# Patient Record
Sex: MALE | Race: WHITE | NOT HISPANIC OR LATINO | Employment: FULL TIME | URBAN - METROPOLITAN AREA
[De-identification: names, ages, dates, MRNs, and addresses within clinical notes are randomized per-mention and may not be internally consistent; named-entity substitution may affect disease eponyms.]

---

## 2023-08-23 ENCOUNTER — HOSPITAL ENCOUNTER (EMERGENCY)
Facility: CLINIC | Age: 28
Discharge: HOME | End: 2023-08-23
Attending: STUDENT IN AN ORGANIZED HEALTH CARE EDUCATION/TRAINING PROGRAM | Admitting: STUDENT IN AN ORGANIZED HEALTH CARE EDUCATION/TRAINING PROGRAM
Payer: COMMERCIAL

## 2023-08-23 VITALS
TEMPERATURE: 99 F | SYSTOLIC BLOOD PRESSURE: 112 MMHG | HEART RATE: 71 BPM | RESPIRATION RATE: 16 BRPM | DIASTOLIC BLOOD PRESSURE: 79 MMHG | OXYGEN SATURATION: 95 %

## 2023-08-23 DIAGNOSIS — M54.50 ACUTE BILATERAL LOW BACK PAIN WITHOUT SCIATICA: ICD-10-CM

## 2023-08-23 LAB
ALBUMIN UR-MCNC: NEGATIVE MG/DL
ANION GAP SERPL CALCULATED.3IONS-SCNC: 6 MMOL/L (ref 7–15)
APPEARANCE UR: CLEAR
BASOPHILS # BLD AUTO: 0.1 10E3/UL (ref 0–0.2)
BASOPHILS NFR BLD AUTO: 1 %
BILIRUB UR QL STRIP: NEGATIVE
BUN SERPL-MCNC: 7.4 MG/DL (ref 6–20)
CALCIUM SERPL-MCNC: 9.6 MG/DL (ref 8.6–10)
CHLORIDE SERPL-SCNC: 106 MMOL/L (ref 98–107)
COLOR UR AUTO: NORMAL
CREAT SERPL-MCNC: 0.8 MG/DL (ref 0.67–1.17)
DEPRECATED HCO3 PLAS-SCNC: 29 MMOL/L (ref 22–29)
EOSINOPHIL # BLD AUTO: 0.3 10E3/UL (ref 0–0.7)
EOSINOPHIL NFR BLD AUTO: 4 %
ERYTHROCYTE [DISTWIDTH] IN BLOOD BY AUTOMATED COUNT: 11.9 % (ref 10–15)
GFR SERPL CREATININE-BSD FRML MDRD: >90 ML/MIN/1.73M2
GLUCOSE SERPL-MCNC: 94 MG/DL (ref 70–99)
GLUCOSE UR STRIP-MCNC: NEGATIVE MG/DL
HCT VFR BLD AUTO: 45.5 % (ref 40–53)
HGB BLD-MCNC: 15 G/DL (ref 13.3–17.7)
HGB UR QL STRIP: NEGATIVE
HOLD SPECIMEN: NORMAL
IMM GRANULOCYTES # BLD: 0 10E3/UL
IMM GRANULOCYTES NFR BLD: 0 %
KETONES UR STRIP-MCNC: NEGATIVE MG/DL
LEUKOCYTE ESTERASE UR QL STRIP: NEGATIVE
LYMPHOCYTES # BLD AUTO: 3.3 10E3/UL (ref 0.8–5.3)
LYMPHOCYTES NFR BLD AUTO: 39 %
MCH RBC QN AUTO: 30.4 PG (ref 26.5–33)
MCHC RBC AUTO-ENTMCNC: 33 G/DL (ref 31.5–36.5)
MCV RBC AUTO: 92 FL (ref 78–100)
MONOCYTES # BLD AUTO: 0.5 10E3/UL (ref 0–1.3)
MONOCYTES NFR BLD AUTO: 6 %
NEUTROPHILS # BLD AUTO: 4.1 10E3/UL (ref 1.6–8.3)
NEUTROPHILS NFR BLD AUTO: 50 %
NITRATE UR QL: NEGATIVE
NRBC # BLD AUTO: 0 10E3/UL
NRBC BLD AUTO-RTO: 0 /100
PH UR STRIP: 7 [PH] (ref 5–7)
PLATELET # BLD AUTO: 225 10E3/UL (ref 150–450)
POTASSIUM SERPL-SCNC: 4.5 MMOL/L (ref 3.4–5.3)
RBC # BLD AUTO: 4.93 10E6/UL (ref 4.4–5.9)
RBC URINE: <1 /HPF
SODIUM SERPL-SCNC: 141 MMOL/L (ref 136–145)
SP GR UR STRIP: 1 (ref 1–1.03)
UROBILINOGEN UR STRIP-MCNC: NORMAL MG/DL
WBC # BLD AUTO: 8.3 10E3/UL (ref 4–11)
WBC URINE: <1 /HPF

## 2023-08-23 PROCEDURE — 250N000013 HC RX MED GY IP 250 OP 250 PS 637: Performed by: STUDENT IN AN ORGANIZED HEALTH CARE EDUCATION/TRAINING PROGRAM

## 2023-08-23 PROCEDURE — 96372 THER/PROPH/DIAG INJ SC/IM: CPT | Performed by: STUDENT IN AN ORGANIZED HEALTH CARE EDUCATION/TRAINING PROGRAM

## 2023-08-23 PROCEDURE — 81001 URINALYSIS AUTO W/SCOPE: CPT | Performed by: STUDENT IN AN ORGANIZED HEALTH CARE EDUCATION/TRAINING PROGRAM

## 2023-08-23 PROCEDURE — 36415 COLL VENOUS BLD VENIPUNCTURE: CPT | Performed by: EMERGENCY MEDICINE

## 2023-08-23 PROCEDURE — 85025 COMPLETE CBC W/AUTO DIFF WBC: CPT | Performed by: STUDENT IN AN ORGANIZED HEALTH CARE EDUCATION/TRAINING PROGRAM

## 2023-08-23 PROCEDURE — 250N000011 HC RX IP 250 OP 636: Mod: JZ | Performed by: STUDENT IN AN ORGANIZED HEALTH CARE EDUCATION/TRAINING PROGRAM

## 2023-08-23 PROCEDURE — 81001 URINALYSIS AUTO W/SCOPE: CPT | Performed by: EMERGENCY MEDICINE

## 2023-08-23 PROCEDURE — 80048 BASIC METABOLIC PNL TOTAL CA: CPT | Performed by: STUDENT IN AN ORGANIZED HEALTH CARE EDUCATION/TRAINING PROGRAM

## 2023-08-23 PROCEDURE — 85004 AUTOMATED DIFF WBC COUNT: CPT | Performed by: EMERGENCY MEDICINE

## 2023-08-23 PROCEDURE — 99284 EMERGENCY DEPT VISIT MOD MDM: CPT

## 2023-08-23 PROCEDURE — 80048 BASIC METABOLIC PNL TOTAL CA: CPT | Performed by: EMERGENCY MEDICINE

## 2023-08-23 RX ORDER — ACETAMINOPHEN 500 MG
1000 TABLET ORAL ONCE
Status: COMPLETED | OUTPATIENT
Start: 2023-08-23 | End: 2023-08-23

## 2023-08-23 RX ORDER — KETOROLAC TROMETHAMINE 15 MG/ML
15 INJECTION, SOLUTION INTRAMUSCULAR; INTRAVENOUS ONCE
Status: COMPLETED | OUTPATIENT
Start: 2023-08-23 | End: 2023-08-23

## 2023-08-23 RX ADMIN — ACETAMINOPHEN 1000 MG: 500 TABLET, FILM COATED ORAL at 14:52

## 2023-08-23 RX ADMIN — KETOROLAC TROMETHAMINE 15 MG: 15 INJECTION, SOLUTION INTRAMUSCULAR; INTRAVENOUS at 14:52

## 2023-08-23 ASSESSMENT — ACTIVITIES OF DAILY LIVING (ADL): ADLS_ACUITY_SCORE: 33

## 2023-08-23 NOTE — ED PROVIDER NOTES
History     Chief Complaint:  Kidney pain      The history is provided by the patient.      Jona Guardado is a 28 year old male with history of anxiety who presents to the ED for evaluation of kidney pain that started yesterday morning when he woke up. Patient thought it was due to him holding in his urine throughout the night which he has had happen before when he was a child. States the pain has persisted throughout the day today and has gotten worse since yesterday. Took Tylenol and Ibuprofen last night with no relief. The pain started on his right side, but then moved to his left side. Patient now has slight abdominal pain as well. He rates his pain a 6/10. Patient went to Urgent Care yesterday for this same issue. Denies fever, difficulty breathing, recent trauma or fall, alcohol use, or drug use.  No saddle anesthesia.  No bowel or bladder incontinence.  No focal motor deficit.  No trauma.    Independent Historian:   None - Patient Only    Review of External Notes:   Urgent care note yesterday.     Medications:    Albuterol  Chantix  Lexapro  Lamictal     Past Medical History:    Bilateral astigmatism  Bilateral myopia  Alcohol use disorder  Anxiety  Vitamin D deficiency   Asthma  Depression  Borderline personality disorder  Dysthymic disorder    Past Surgical History:    The patient has no pertinent past surgical history.     Physical Exam   Patient Vitals for the past 24 hrs:   BP Temp Temp src Pulse Resp SpO2   08/23/23 1541 112/79 -- -- 71 -- 95 %   08/23/23 1308 130/83 99  F (37.2  C) Oral 79 16 98 %      Physical Exam  GENERAL: Patient well-appearing  HEAD: Atraumatic.  NECK: No rigidity  CV: RRR, no murmurs rubs or gallops  PULM: CTAB with good aeration; no retractions, rales, rhonchi, or wheezing  ABD: Soft, nontender, nondistended, no guarding  DERM: No rash. Skin warm and dry  EXTREMITY: Moving all extremities without difficulty.   NEURO: Strength 5 out of 5 bilateral lower extremities.  Sensation  intact to light touch bilateral extremities.  VASCULAR: Symmetric pulses bilaterally   Back: No midline spinal tenderness.  No CVA tenderness.  No skin changes.    Emergency Department Course   Laboratory:  Labs Ordered and Resulted from Time of ED Arrival to Time of ED Departure   BASIC METABOLIC PANEL - Abnormal       Result Value    Sodium 141      Potassium 4.5      Chloride 106      Carbon Dioxide (CO2) 29      Anion Gap 6 (*)     Urea Nitrogen 7.4      Creatinine 0.80      Calcium 9.6      Glucose 94      GFR Estimate >90     ROUTINE UA WITH MICROSCOPIC REFLEX TO CULTURE - Normal    Color Urine Straw      Appearance Urine Clear      Glucose Urine Negative      Bilirubin Urine Negative      Ketones Urine Negative      Specific Gravity Urine 1.005      Blood Urine Negative      pH Urine 7.0      Protein Albumin Urine Negative      Urobilinogen Urine Normal      Nitrite Urine Negative      Leukocyte Esterase Urine Negative      RBC Urine <1      WBC Urine <1     CBC WITH PLATELETS AND DIFFERENTIAL    WBC Count 8.3      RBC Count 4.93      Hemoglobin 15.0      Hematocrit 45.5      MCV 92      MCH 30.4      MCHC 33.0      RDW 11.9      Platelet Count 225      % Neutrophils 50      % Lymphocytes 39      % Monocytes 6      % Eosinophils 4      % Basophils 1      % Immature Granulocytes 0      NRBCs per 100 WBC 0      Absolute Neutrophils 4.1      Absolute Lymphocytes 3.3      Absolute Monocytes 0.5      Absolute Eosinophils 0.3      Absolute Basophils 0.1      Absolute Immature Granulocytes 0.0      Absolute NRBCs 0.0        Emergency Department Course & Assessments:     Interventions:  Medications   ketorolac (TORADOL) injection 15 mg (15 mg Intramuscular $Given 8/23/23 1452)   acetaminophen (TYLENOL) tablet 1,000 mg (1,000 mg Oral $Given 8/23/23 1452)      Assessments/Consultations/Discussion of Management or Tests:  ED Course as of 08/23/23 1553   Wed Aug 23, 2023   5814 I obtained history and examined the patient  as noted above.      Social Determinants of Health affecting care:   None    Disposition:  The patient was discharged to home.     Impression & Plan    Medical Decision Making:  Patient with nonspecific bilateral flank pain/back pain.    Differential diagnosis considered, but not limited to fracture, cauda equina, epidural abscess, but evaluation not consistent with these etiologies.     Vital signs unremarkable.     CBC, BMP, UA unremarkable.    No red flags for back pain and thus considered advanced CT/MRI imaging, but not emergently indicated.    Furthermore does not appear consistent with stone.  No shortness of breath to suggest pneumonia.  Not consistent with PE.    Given IM Toradol, Tylenol.  Patient states he does not have much improvement but he is resting comfortably no acute distress on multiple exams.  There is some positional component to it.    Ultimately, feel patient is safe for outpatient follow-up.    Patient was evaluated for acute medical emergencies. Based on my clinical assessment, I do not think any further acute management or work-up is required.  Patient stable for discharge. Given strict return precautions. All questions answered. Patient content with plan. Recommended PCP follow-up in 2-3 days.      Diagnosis:    ICD-10-CM    1. Acute bilateral low back pain without sciatica  M54.50          Scribe Disclosure:  UZAIR SHERIDAN, am serving as a scribe at 3:53 PM on 8/23/2023 to document services personally performed by Troy Lowe MD  based on my observations and the provider's statements to me.     8/23/2023   Troy Lowe MD Foss, Kevin, MD  08/23/23 8902

## 2023-08-23 NOTE — ED TRIAGE NOTES
Pt presents to ED with c/o flank pain that started yesterday morning. Pt states that pain started on the right side and moved to the left side. Pt denies pain with urination.

## 2025-07-01 ENCOUNTER — HOSPITAL ENCOUNTER (EMERGENCY)
Facility: CLINIC | Age: 30
Discharge: HOME | End: 2025-07-01
Attending: EMERGENCY MEDICINE | Admitting: EMERGENCY MEDICINE
Payer: COMMERCIAL

## 2025-07-01 ENCOUNTER — APPOINTMENT (OUTPATIENT)
Dept: GENERAL RADIOLOGY | Facility: CLINIC | Age: 30
End: 2025-07-01
Attending: EMERGENCY MEDICINE
Payer: COMMERCIAL

## 2025-07-01 VITALS
WEIGHT: 166.23 LBS | OXYGEN SATURATION: 97 % | HEIGHT: 70 IN | HEART RATE: 77 BPM | BODY MASS INDEX: 23.8 KG/M2 | TEMPERATURE: 99.1 F | SYSTOLIC BLOOD PRESSURE: 132 MMHG | RESPIRATION RATE: 18 BRPM | DIASTOLIC BLOOD PRESSURE: 85 MMHG

## 2025-07-01 DIAGNOSIS — R06.02 SHORTNESS OF BREATH: ICD-10-CM

## 2025-07-01 DIAGNOSIS — M79.604 RIGHT LEG PAIN: ICD-10-CM

## 2025-07-01 LAB
ATRIAL RATE - MUSE: 77 BPM
DIASTOLIC BLOOD PRESSURE - MUSE: NORMAL MMHG
INTERPRETATION ECG - MUSE: NORMAL
P AXIS - MUSE: 63 DEGREES
PR INTERVAL - MUSE: 152 MS
QRS DURATION - MUSE: 92 MS
QT - MUSE: 368 MS
QTC - MUSE: 416 MS
R AXIS - MUSE: 81 DEGREES
SYSTOLIC BLOOD PRESSURE - MUSE: NORMAL MMHG
T AXIS - MUSE: 57 DEGREES
VENTRICULAR RATE- MUSE: 77 BPM

## 2025-07-01 PROCEDURE — 71046 X-RAY EXAM CHEST 2 VIEWS: CPT

## 2025-07-01 PROCEDURE — 99285 EMERGENCY DEPT VISIT HI MDM: CPT | Mod: 25 | Performed by: EMERGENCY MEDICINE

## 2025-07-01 PROCEDURE — 93005 ELECTROCARDIOGRAM TRACING: CPT | Performed by: EMERGENCY MEDICINE

## 2025-07-01 ASSESSMENT — ACTIVITIES OF DAILY LIVING (ADL): ADLS_ACUITY_SCORE: 41

## 2025-07-01 NOTE — ED TRIAGE NOTES
Patient presents to the ED with complaints of R leg pain. Patient gave themselves an injection last night and now is concerned for a blood clot. Reports area of injection is warm and painful. Also reports SOB. ABCs intact

## 2025-07-01 NOTE — ED PROVIDER NOTES
"  Emergency Department Note      History of Present Illness     Chief Complaint   Leg Pain and Shortness of Breath      HPI   Jona Guardado is a 30 year old male with a history of asthma here for evaluation of leg pain and shortness of breath. The patient states that for the past 5 days they has been experience shortness of breath with and increase in shortness of breath this morning. They reports that he is having a difficult time catching his breath. They say that they used their asthma inhaler which did not provide relief. They notes that last night they gave themself their estrogen injections and immediately had a pain shoot up the right inner thigh. They denies that this has ever occurred before. They has been taking the estrogen injections for the past 3 months. They notes that the injection site is swollen, painful, and warm. They report having pain in his right foot. The patient notes that he has a cough, but they have a history of tobacco use. No chest pain. No fever. No abdominal pain. No vomiting or nausea. No diarrhea. No medical issues. No hypertension, diabetes. No recent travel or long car trips. No abdominal surgeries. They note that their father had a heart attack.    Independent Historian   None    Review of External Notes   I reviewed UC note from 12/27/2024.     Past Medical History     Medical History and Problem List   Asthma  Chronic fatigue and malaise  Regular astigmatism  Myopia  Alcohol use disorder  Generalized anxiety disorder  Night terrors  Major depressive disorder  Borderline personality disorder    Medications   Albuterol  Escitalopram oxalate  Lamotrigine  Lidocaine  Dextromethorphan  Estradiol valerate  spironolactone    Surgical History   No past surgical history on file.    Physical Exam     Patient Vitals for the past 24 hrs:   BP Temp Temp src Pulse Resp SpO2 Height Weight   07/01/25 1325 (!) 123/93 99.1  F (37.3  C) Temporal 81 22 100 % 1.778 m (5' 10\") 75.4 kg (166 lb 3.6 oz) "     Physical Exam  GENERAL: Awake, alert  CARDIOVASCULAR: Regular rate and rhythm  LUNGS: Clear bilaterally, no wheezes rales or rhonchi  EXTREMITIES: No peripheral edema. Puncture site to the right upper thigh with no underlying fluctuance, surrounding erythema, or swelling around the site.  Mild tenderness around the site. Tenderness over right plantar fascia but normal sensation over right leg, no erythema or warmth over right lower extremity  NEURO: Normal flexion extension of right hip knee and ankle    Diagnostics     Lab Results   Labs Ordered and Resulted from Time of ED Arrival to Time of ED Departure - No data to display    Imaging   Chest XR,  PA & LAT   Final Result   IMPRESSION: Negative chest. Lungs clear.          EKG   ECG results from 07/01/25   EKG 12 lead     Value    Systolic Blood Pressure     Diastolic Blood Pressure     Ventricular Rate 77    Atrial Rate 77    [place] Interval 152    QRS Duration 92        QTc 416    P Axis 63    R AXIS 81    T Axis 57    Interpretation ECG      Sinus rhythm  Normal ECG  Interpreted by me at 1440      Independent Interpretation   CXR: No pneumothorax, infiltrate, pleural effusion, pulmonary edema, visible rib fracture, cardiomegaly, or mediastinal widening.    ED Course      Medications Administered   Medications - No data to display    Procedures   Procedures     Discussion of Management   None    ED Course   ED Course as of 07/01/25 1440   Tue Jul 01, 2025   1401 I obtained history and examined the patient as noted above.        Additional Documentation  None    Medical Decision Making / Diagnosis     CMS Diagnoses: None    MIPS   None      MDM   Jona Guardado is a 30 year old male who presents emergency department for evaluation of right leg pain after an injection yesterday.  He has no erythema or warmth or signs of any infection around the site.  Leg is not swollen.  Does not appear to be consistent with DVT.  Did report some shortness of breath over the  past several days but is not hypoxic here, no increased work of breathing, no tachycardia, no history of DVT or other risk factors for DVT aside from some estrogen use.  Very low suspicion for DVT or PE at this point.  Chest x-ray unremarkable.  EKG showed no ischemic changes.  Believe patient is stable for close monitoring of his symptoms at home, if he experiences any chest pain, swelling of the leg, or worsening shortness of breath I recommended that he return and is stable for discharge.    Disposition   The patient was discharged.     Diagnosis     ICD-10-CM    1. Right leg pain  M79.604       2. Shortness of breath  R06.02            Discharge Medications   New Prescriptions    No medications on file         Scribe Disclosure:  I, Taylor Forte, am serving as a scribe at 2:20 PM on 7/1/2025 to document services personally performed by Uma Chavez MD based on my observations and the provider's statements to me.        Uma Chavez MD  07/01/25 6077

## 2025-07-01 NOTE — DISCHARGE INSTRUCTIONS
Your leg appears normal, is not red or swollen, very low suspicion for blood clot at this time.  Your chest x-ray and EKG also look normal, please follow-up with your primary doctor if symptoms still persist